# Patient Record
Sex: MALE | Race: WHITE | NOT HISPANIC OR LATINO | URBAN - METROPOLITAN AREA
[De-identification: names, ages, dates, MRNs, and addresses within clinical notes are randomized per-mention and may not be internally consistent; named-entity substitution may affect disease eponyms.]

---

## 2019-02-13 ENCOUNTER — OFFICE VISIT - HEALTHEAST (OUTPATIENT)
Dept: FAMILY MEDICINE | Facility: CLINIC | Age: 29
End: 2019-02-13

## 2019-02-13 DIAGNOSIS — R07.89 ATYPICAL CHEST PAIN: ICD-10-CM

## 2019-02-13 DIAGNOSIS — Z00.00 VISIT FOR PREVENTIVE HEALTH EXAMINATION: ICD-10-CM

## 2019-02-13 LAB
ALBUMIN SERPL-MCNC: 4.5 G/DL (ref 3.5–5)
ALP SERPL-CCNC: 78 U/L (ref 45–120)
ALT SERPL W P-5'-P-CCNC: 18 U/L (ref 0–45)
ANION GAP SERPL CALCULATED.3IONS-SCNC: 5 MMOL/L (ref 5–18)
AST SERPL W P-5'-P-CCNC: 19 U/L (ref 0–40)
BILIRUB SERPL-MCNC: 1.1 MG/DL (ref 0–1)
BUN SERPL-MCNC: 13 MG/DL (ref 8–22)
CALCIUM SERPL-MCNC: 9.2 MG/DL (ref 8.5–10.5)
CHLORIDE BLD-SCNC: 106 MMOL/L (ref 98–107)
CHOLEST SERPL-MCNC: 184 MG/DL
CO2 SERPL-SCNC: 31 MMOL/L (ref 22–31)
CREAT SERPL-MCNC: 1.01 MG/DL (ref 0.7–1.3)
FASTING STATUS PATIENT QL REPORTED: NORMAL
GFR SERPL CREATININE-BSD FRML MDRD: >60 ML/MIN/1.73M2
GLUCOSE BLD-MCNC: 80 MG/DL (ref 70–125)
HBA1C MFR BLD: 4.7 % (ref 3.5–6)
HDLC SERPL-MCNC: 66 MG/DL
LDLC SERPL CALC-MCNC: 104 MG/DL
POTASSIUM BLD-SCNC: 4 MMOL/L (ref 3.5–5)
PROT SERPL-MCNC: 6.8 G/DL (ref 6–8)
SODIUM SERPL-SCNC: 142 MMOL/L (ref 136–145)
TRIGL SERPL-MCNC: 71 MG/DL

## 2019-02-13 ASSESSMENT — MIFFLIN-ST. JEOR: SCORE: 1721.96

## 2019-02-14 ENCOUNTER — COMMUNICATION - HEALTHEAST (OUTPATIENT)
Dept: FAMILY MEDICINE | Facility: CLINIC | Age: 29
End: 2019-02-14

## 2019-04-10 ENCOUNTER — COMMUNICATION - HEALTHEAST (OUTPATIENT)
Dept: FAMILY MEDICINE | Facility: CLINIC | Age: 29
End: 2019-04-10

## 2019-04-18 ENCOUNTER — ANCILLARY PROCEDURE (OUTPATIENT)
Dept: CARDIOLOGY | Facility: CLINIC | Age: 29
End: 2019-04-18
Attending: FAMILY MEDICINE
Payer: COMMERCIAL

## 2019-04-18 ENCOUNTER — RECORDS - HEALTHEAST (OUTPATIENT)
Dept: ADMINISTRATIVE | Facility: OTHER | Age: 29
End: 2019-04-18

## 2019-04-18 DIAGNOSIS — R07.89 ATYPICAL CHEST PAIN: ICD-10-CM

## 2019-10-07 ENCOUNTER — AMBULATORY - HEALTHEAST (OUTPATIENT)
Dept: FAMILY MEDICINE | Facility: CLINIC | Age: 29
End: 2019-10-07

## 2020-02-13 ENCOUNTER — COMMUNICATION - HEALTHEAST (OUTPATIENT)
Dept: FAMILY MEDICINE | Facility: CLINIC | Age: 30
End: 2020-02-13

## 2021-04-01 ENCOUNTER — E-VISIT (OUTPATIENT)
Dept: URGENT CARE | Facility: URGENT CARE | Age: 31
End: 2021-04-01
Payer: COMMERCIAL

## 2021-04-01 DIAGNOSIS — J02.9 SORE THROAT: ICD-10-CM

## 2021-04-01 DIAGNOSIS — Z20.822 SUSPECTED COVID-19 VIRUS INFECTION: ICD-10-CM

## 2021-04-01 PROCEDURE — 99421 OL DIG E/M SVC 5-10 MIN: CPT | Performed by: PHYSICIAN ASSISTANT

## 2021-04-01 NOTE — PATIENT INSTRUCTIONS
Dear Turner Jasso,    Your symptoms show that you may have coronavirus (COVID-19). This illness can cause fever, cough and trouble breathing. Many people get a mild case and get better on their own. Some people can get very sick.    Because you also reported sore throat I would like to also test you for Strep Throat to determine if we need to treat you for that as well.    What should I do?  We would like to test you for Covid-19 virus and Strep Throat. I have placed orders for these tests.   To schedule: go to your [employer] home page and scroll down to the section that says  You have an appointment that needs to be scheduled  and click the large green button that says  Schedule Now  and follow the steps to find the next available openings. It is important that when you are asked what the reason for your appointment is that you mention you need BOTH Covid and Strep tests.    If you are unable to complete these [employer] scheduling steps, please call [phone] to schedule your testing.     Return to work/school/ guidance:   Please let your workplace manager and staffing office know when your quarantine ends     We can t give you an exact date as it depends on the above. You can calculate this on your own or work with your manager/staffing office to calculate this. (For example if you were exposed on 10/4, you would have to quarantine for 14 full days. That would be through 10/18. You could return on 10/19.)      If you receive a positive COVID-19 test result, follow the guidance of the those who are giving you the results. Usually the return to work is 10 (or in some cases 20 days from symptom onset.) If you work at [facility] [facility], you must also be cleared by Employee Occupational Health and Safety to return to work.        If you receive a negative COVID-19 test result and did not have a high risk exposure to someone with a known positive COVID-19 test, you can return to work once you're free of fever  for 24 hours without fever-reducing medication and your symptoms are improving or resolved.      If you receive a negative COVID-19 test and If you had a high risk exposure to someone who has tested positive for COVID-19 then you can return to work 14 days after your last contact with the positive individual    Note: If you have ongoing exposure to the covid positive person, this quarantine period may be more than 14 days. (For example, if you are continued to be exposed to your child who tested positive and cannot isolate from them, then the quarantine of 7-14 days can't start until your child is no longer contagious. This is typically 10 days from onset of the child's symptoms. So the total duration may be 17-24 days in this case.)    Sign up for [email or web address].   We know it's scary to hear that you might have COVID-19. We want to track your symptoms to make sure you're okay over the next 2 weeks. Please look for an email from [email or web address]--this is a free, online program that we'll use to keep in touch. To sign up, follow the link in the email you will receive. Learn more at [email or web address]    How can I take care of myself?    Get lots of rest. Drink extra fluids (unless a doctor has told you not to)    Take Tylenol (acetaminophen) or ibuprofen for fever or pain. If you have liver or kidney problems, ask your family doctor if it's okay to take Tylenol o ibuprofen    If you have other health problems (like cancer, heart failure, an organ transplant or severe kidney disease): Call your specialty clinic if you don't feel better in the next 2 days.    Know when to call 911. Emergency warning signs include:  o Trouble breathing or shortness of breath  o Pain or pressure in the chest that doesn't go away  o Feeling confused like you haven't felt before, or not being able to wake up  o Bluish-colored lips or face    Where can I get more information?  [facility] - About COVID-19:    [email or web address]    CDC - What to Do If You're Sick:   www.cdc.gov/coronavirus/2019-ncov/about/steps-when-sick.html

## 2021-04-21 ENCOUNTER — AMBULATORY - HEALTHEAST (OUTPATIENT)
Dept: NURSING | Facility: CLINIC | Age: 31
End: 2021-04-21

## 2021-04-25 ENCOUNTER — HEALTH MAINTENANCE LETTER (OUTPATIENT)
Age: 31
End: 2021-04-25

## 2021-05-12 ENCOUNTER — AMBULATORY - HEALTHEAST (OUTPATIENT)
Dept: NURSING | Facility: CLINIC | Age: 31
End: 2021-05-12

## 2021-05-27 ENCOUNTER — RECORDS - HEALTHEAST (OUTPATIENT)
Dept: ADMINISTRATIVE | Facility: CLINIC | Age: 31
End: 2021-05-27

## 2021-05-27 NOTE — TELEPHONE ENCOUNTER
PT saw Dr. Tong In 2/2019.  I asked if this is for EKG or the echo?  Pt states it's the echo.  Pt will have it done at [facility] instead.

## 2021-05-27 NOTE — TELEPHONE ENCOUNTER
Orders being requested: to go [facility]   Reason service is needed/diagnosis: ekg  When are orders needed by: asap  Where to send Orders: Fax: [phone]  Okay to leave detailed message?  Yes

## 2021-06-02 VITALS — WEIGHT: 170.75 LBS | HEIGHT: 69 IN | BODY MASS INDEX: 25.29 KG/M2

## 2021-06-02 NOTE — PROGRESS NOTES
Called and spoke with the patient on October 7, 2019, echocardiogram was done on the April 18, 2019 at the [facility], but I did not get the results up until today.  Echocardiogram was normal, patient stating that he still experiencing occasional upper left chest pain, we discussed about the possibility of a muscular chest pain causing his symptoms, symptomatic care advised, and follow-up as needed.

## 2021-06-06 NOTE — TELEPHONE ENCOUNTER
Patient Quality Outreach      Summary:    Patient is due/failing the following:   Asthma follow-up visit    Type of outreach:    Sent [email or web address] message.    Questions for provider review:    None                                                                                   **Start Working phrase here:**  Patient has the following on his problem list:     Depression / Dysthymia review       PHQ-9 DEPRESSION SCALE 2/13/2019   Little interest or pleasure in doing things 3   Feeling down, depressed, or hopeless 2   Trouble falling or staying asleep, or sleeping too much 2   Feeling tired or having little energy 1   Poor appetite or overeating 0   Feeling bad about yourself - or that you are a failure or have let yourself or your family down 1   Trouble concentrating on things, such as reading the newspaper or watching television 1   Moving or speaking so slowly that other people could have noticed. Or the opposite - being so fidgety or restless that you have been moving around a lot more than usual 1   Thoughts that you would be better off dead, or of hurting yourself in some way 0   PHQ-9 Total Score 11   Some recent data might be hidden       If PHQ-9 recheck is 5 or more, route to provider for next steps. and     Asthma review      @[id number])@                                                     Charo RUST CMA     Chart routed to Care Team.

## 2021-06-17 NOTE — PATIENT INSTRUCTIONS - HE
Patient Instructions by Maurice Tong MD at 2/13/2019  3:00 PM     Author: Maurice Tong MD Service: -- Author Type: Physician    Filed: 2/14/2019 10:49 AM Encounter Date: 2/13/2019 Status: Signed    : Maurice Tong MD (Physician)       Patient Education     Prevention Guidelines, Men Ages 18 to 39  Screening tests and vaccines are an important part of managing your health. A screening test is done to find possible disorders or diseases in people who don't have any symptoms. The goal is to find a disease early so lifestyle changes can be made and you can be watched more closely to reduce the risk of disease, or to detect it early enough to treat it most effectively. Screening tests are not considered diagnostic, but are used to determine if more testing is needed. Health counseling is essential, too. Below are guidelines for these, for men ages 18 to 39. Talk with your healthcare provider to make sure youre up-to-date on what you need.  Screening Who needs it How often   Alcohol misuse All men in this age group At routine exams   Blood pressure All men in this age group Yearly checkup if your blood pressure is normal  Normal blood pressure is less than 120/80  If your blood pressure is higher than normal, follow the advice of your healthcare provider.     All men in this age group At routine exams   Diabetes mellitus, type 2 All men who have no symptoms but are overweight or obese and have 1 or more other risk factors for diabetes At least every 3 years (yearly if blood sugar has already started to rise)   Diabetes mellitus, type 2  All men with prediabetes Every year   Hepatitis C If at increased risk At routine exams   High cholesterol or triglycerides All men ages 35 and older, and younger men at high risk for coronary artery disease At least every 5 years   HIV All men At routine exams   Obesity All men in this age group At routine exams   Syphilis Men at increased risk  for infection - talk with your healthcare provider At routine exams   Tuberculosis Men at increased risk for infection - talk with your healthcare provider Check with your healthcare provider   Vision All men in this age group Every 5 to 10 years if no risk factors for eye disease   Vaccines Who needs it How often   Chickenpox (varicella) All men in this age group who have no record of this infection or vaccine 2 doses; the second dose should be given at least 4 weeks after the first dose   Hepatitis A Men at increased risk for infection - talk with your healthcare provider 2 doses given at least 6 months apart   Hepatitis B Men at increased risk for infection - talk with your healthcare provider 3 doses over 6 months; second dose should be given 1 month after the first dose; the third dose should be given at least 2 months after the second dose and at least 4 months after the first dose   Haemophilus influenzae Type B (HIB) Men at increased risk for infection - talk with your healthcare provider 1 to 3 doses   Human papillomavirus (HPV) All men in this age group up to age 26 3 doses; the second dose should be given 1 to 2 months after the first dose and the third dose given 6 months after the first dose   Influenza (flu) All men in this age group Once a year   Measles, mumps, rubella (MMR) All men in this age group who have no record of these infections or vaccines 1 or 2 doses through age 55   Meningococcal Men at increased risk for infection - talk with your healthcare provider 1 or more doses   Pneumococca (PCV13) and Pneumococcal (PPSV23) Men at increased risk for infection - talk with your healthcare provider PCV13: 1 dose ages 19 to 65 (protects against 13 types of pneumococcal bacteria)  PPSV23: 1 to 2 doses through age 64, or 1 dose at 65 or older (protects against 23 types of pneumococcal bacteria)   Tetanus/diphtheria/pertussis (Td/Tdap) booster All men in this age group A one-time Tdap booster after age  18, then Td every10 years   Counseling Who needs it How often   Diet and exercise Overweight or obese people When diagnosed, and then at routine exams   Use of tobacco and the health effects it can cause All men in this age group Every visit   Sexually transmitted infection prevention Men who are sexually active At routine exams   Skin cancer Prevention of skin cancer in fair-skinned adults through age 24 At routine exams   1Those who are 18 years of age, who are not up-to-date on their childhood immunizations, should receive all appropriate catch-up vaccines recommended by the CDC.  Date Last Reviewed: 2/1/2017 2000-2017 [employer]. [address] [place] [address], [place] [address]. All rights reserved. This information is not intended as a substitute for professional medical care. Always follow your healthcare professional's instructions.

## 2021-06-18 NOTE — LETTER
Letter by Maurice Tong MD at      Author: Maurice Tong MD Service: -- Author Type: --    Filed:  Encounter Date: 2/14/2019 Status: (Other)       Turner Jasso  [address]  [place] [address]             February 14, 2019         Dear Mr. Jasso,    Below are the results from your recent visit:    Resulted Orders   Glycosylated Hemoglobin A1c   Result Value Ref Range    Hemoglobin A1c 4.7 3.5 - 6.0 %   Comprehensive Metabolic Panel   Result Value Ref Range    Sodium 142 136 - 145 mmol/L    Potassium 4.0 3.5 - 5.0 mmol/L    Chloride 106 98 - 107 mmol/L    CO2 31 22 - 31 mmol/L    Anion Gap, Calculation 5 5 - 18 mmol/L    Glucose 80 70 - 125 mg/dL    BUN 13 8 - 22 mg/dL    Creatinine 1.01 0.70 - 1.30 mg/dL    GFR MDRD Af Amer >60 >60 mL/min/1.73m2    GFR MDRD Non Af Amer >60 >60 mL/min/1.73m2    Bilirubin, Total 1.1 (H) 0.0 - 1.0 mg/dL    Calcium 9.2 8.5 - 10.5 mg/dL    Protein, Total 6.8 6.0 - 8.0 g/dL    Albumin 4.5 3.5 - 5.0 g/dL    Alkaline Phosphatase 78 45 - 120 U/L    AST 19 0 - 40 U/L    ALT 18 0 - 45 U/L    Narrative    Fasting Glucose reference range is 70-99 mg/dL per  American Diabetes Association (ADA) guidelines.   Lipid Cascade   Result Value Ref Range    Cholesterol 184 <=199 mg/dL    Triglycerides 71 <=149 mg/dL    HDL Cholesterol 66 >=40 mg/dL    LDL Calculated 104 <=129 mg/dL    Patient Fasting > 8hrs? Unknown        Your recent blood test results  were all within acceptable limit.  Please do not hesitate to contact me if you have any question.  Dr Tong      Please call with questions or contact us using [employer].    Sincerely,        Electronically signed by Maurice Tong MD

## 2021-06-24 NOTE — PROGRESS NOTES
Assessment:      Healthy male exam.        Plan:     1.  A typical left-sided chest pain, persistent over time, did review possible differential diagnosis, we will get an echocardiogram, if normal then will continue to focus on musculoskeletal chest pain management.  2. Patient Counseling:  --Nutrition: Stressed importance of moderation in sodium/caffeine intake, saturated fat and cholesterol, caloric balance, sufficient intake of fresh fruits, vegetables, fiber, calcium, iron.  --Discussed the issue of calcium supplement, and the daily use of baby aspirin.  --Exercise: Stressed the importance of regular exercise.   --Substance Abuse: Discussed cessation/primary prevention of tobacco, alcohol, or other drug use; driving or other dangerous activities under the influence; availability of treatment for abuse.    --Sexuality: Discussed sexually transmitted diseases, partner selection, use of condoms, avoidance of unintended pregnancy.  --Injury prevention: Discussed safety belts, safety helmets, smoke detector, smoking near bedding or upholstery.   --Dental health: Discussed importance of regular tooth brushing, flossing, and dental visits.  --Immunizations reviewed.  --Discussed timing and benefits of screening colonoscopy and alternative options.  --After hours service discussed with patient             -- I have had an Advance Directives discussion with the patient.  The following high BMI interventions were performed this visit: encouragement to exercise    3. Discussed the patient's BMI with him.  The BMI is above average; BMI management plan is completed  4. Follow up as needed for acute illness     Subjective:      Turner Jasos is a 28 y.o. male who presents for an annual exam. The patient reports that there is not domestic violence in his life.   Last office visit was in 2014, stating that he has been doing okay since then, no major medical complaint, intermittent left-sided chest pain, sharp lasting a few  minutes, not typically related with exercise or rest, does appear randomly, sometimes short of breath.  Has tried PPI in the past with mixed result.  Chest pain was more frequent when he was sedentary about a year ago, but by staying active the pain seems to be recurring once or twice a month.    Healthy Habits:   Regular Exercise: Yes  Sunscreen Use: Yes  Healthy Diet: Yes  Dental Visits Regularly: Yes  Seat Belt: Yes  Sexually active: Yes  Monthly Self Testicular Exams:  Yes  Hemoccults: N/A  Flex Sig: N/A  Colonoscopy: N/A  Lipid Profile: No  Glucose Screen: Yes  Prevention of Osteoporosis: Yes  Last Dexa: N/A  Guns at Home:  N/A      Immunization History   Administered Date(s) Administered     DTaP, historic 01/08/1992, 03/06/1992, 05/07/1992, 09/02/1993, 01/03/1997     Hep B, historic 07/18/2003, 08/18/2003, 03/05/2004     HiB, historic,unspecified 03/16/1992, 06/08/1992     IPV 01/08/1992, 03/06/1992, 05/07/1992, 01/03/1997     Influenza, inj, historic,unspecified 01/09/2002, 01/15/2003, 12/02/2003     MMR 09/02/1993, 01/03/1997, 07/18/2003     Td,adult,historic,unspecified 07/18/2003     Tdap 02/13/2019     Varicella 01/01/1900, 09/15/2000     Immunization status: stated as current, but no records available.  Vision Screening:both eyes  Hearing: PASS     No current outpatient medications on file.     No current facility-administered medications for this visit.      No past medical history on file.  No past surgical history on file.  Patient has no known allergies.  No family history on file.  Social History     Socioeconomic History     Marital status: Single     Spouse name: Not on file     Number of children: Not on file     Years of education: Not on file     Highest education level: Not on file   Social Needs     Financial resource strain: Not on file     Food insecurity - worry: Not on file     Food insecurity - inability: Not on file     Transportation needs - medical: Not on file     Transportation  "needs - non-medical: Not on file   Occupational History     Not on file   Tobacco Use     Smoking status: Never Smoker     Smokeless tobacco: Never Used   Substance and Sexual Activity     Alcohol use: Yes     Frequency: 2-3 times a week     Drug use: Not on file     Sexual activity: Not on file   Other Topics Concern     Not on file   Social History Narrative     Not on file     No specialty comments available.  Review of Systems  General:  Denies problem  Eyes: Denies problem  Ears/Nose/Throat: Denies problem  Cardiovascular: Denies problem  Respiratory:  Denies problem  Gastrointestinal:  Denies problem  Genitourinary: Denies problem  Musculoskeletal:  Denies problem  Skin: Denies problem  Neurologic: Denies problem  Psychiatric: Denies problem  Endocrine: Denies problem  Heme/Lymphatic: Denies problem   Allergic/Immunologic: Denies problem        Objective:     Vitals:    02/13/19 1510   BP: 115/65   Pulse: 68   SpO2: 99%   Weight: 170 lb 12 oz (77.5 kg)   Height: 5' 8.5\" (1.74 m)     Body mass index is 25.58 kg/m .    Physical  General Appearance: Alert, cooperative, no distress, appears stated age  Head: Normocephalic, without obvious abnormality, atraumatic  Eyes: PERRL, conjunctiva/corneas clear, EOM's intact  Ears: Normal TM's and external ear canals, both ears  Nose: Nares normal, septum midline,mucosa normal, no drainage  Throat: Lips, mucosa, and tongue normal; teeth and gums normal  Neck: Supple, symmetrical, trachea midline, no adenopathy;  thyroid: not enlarged, symmetric, no tenderness/mass/nodules; no carotid bruit or JVD  Back: Symmetric, no curvature, ROM normal, no CVA tenderness  Lungs: Clear to auscultation bilaterally, respirations unlabored  Heart: Regular rate and rhythm, S1 and S2 normal, no murmur, rub, or gallop,  Abdomen: Soft, non-tender, bowel sounds active all four quadrants,  no masses, no organomegaly  Genitourinary: Penis normal. Right and left testis are descended.No palpable " masses.   Rectal: No visible external lesion  Musculoskeletal: Normal range of motion. No joint swelling or deformity.   Extremities: Extremities normal, atraumatic, no cyanosis or edema  Skin: Skin color, texture, turgor normal, no rashes or lesions  Lymph nodes: Cervical, supraclavicular, and axillary nodes normal  Neurologic: He is alert. He has normal reflexes.   Psychiatric: He has a normal mood and affect.        MD Turner Green was seen today for establish care, annual exam and chest pain.    Diagnoses and all orders for this visit:    Visit for preventive health examination  -     Glycosylated Hemoglobin A1c  -     Comprehensive Metabolic Panel  -     Lipid Cascade    Atypical chest pain  -     Echo Complete; Future    Other orders  -     Tdap vaccine,  6yo or older,  IM

## 2021-10-10 ENCOUNTER — HEALTH MAINTENANCE LETTER (OUTPATIENT)
Age: 31
End: 2021-10-10

## 2022-05-21 ENCOUNTER — HEALTH MAINTENANCE LETTER (OUTPATIENT)
Age: 32
End: 2022-05-21

## 2022-09-18 ENCOUNTER — HEALTH MAINTENANCE LETTER (OUTPATIENT)
Age: 32
End: 2022-09-18

## 2023-04-27 ENCOUNTER — OFFICE VISIT (OUTPATIENT)
Dept: FAMILY MEDICINE | Facility: CLINIC | Age: 33
End: 2023-04-27
Payer: COMMERCIAL

## 2023-04-27 VITALS
TEMPERATURE: 98.1 F | RESPIRATION RATE: 16 BRPM | SYSTOLIC BLOOD PRESSURE: 122 MMHG | BODY MASS INDEX: 27.13 KG/M2 | WEIGHT: 183.2 LBS | HEART RATE: 80 BPM | OXYGEN SATURATION: 100 % | DIASTOLIC BLOOD PRESSURE: 71 MMHG | HEIGHT: 69 IN

## 2023-04-27 DIAGNOSIS — Z13.1 SCREENING FOR DIABETES MELLITUS: ICD-10-CM

## 2023-04-27 DIAGNOSIS — K92.9 FUNCTIONAL GASTROINTESTINAL DISTURBANCE: ICD-10-CM

## 2023-04-27 DIAGNOSIS — Z13.220 LIPID SCREENING: ICD-10-CM

## 2023-04-27 DIAGNOSIS — M54.42 ACUTE BILATERAL LOW BACK PAIN WITH LEFT-SIDED SCIATICA: ICD-10-CM

## 2023-04-27 DIAGNOSIS — Z00.00 ROUTINE GENERAL MEDICAL EXAMINATION AT A HEALTH CARE FACILITY: Primary | ICD-10-CM

## 2023-04-27 DIAGNOSIS — Z13.0 SCREENING FOR IRON DEFICIENCY ANEMIA: ICD-10-CM

## 2023-04-27 LAB
CHOLEST SERPL-MCNC: 176 MG/DL
ERYTHROCYTE [DISTWIDTH] IN BLOOD BY AUTOMATED COUNT: 12.4 % (ref 10–15)
HBA1C MFR BLD: 5 % (ref 0–5.6)
HCT VFR BLD AUTO: 44.7 % (ref 40–53)
HDLC SERPL-MCNC: 47 MG/DL
HGB BLD-MCNC: 15 G/DL (ref 13.3–17.7)
LDLC SERPL CALC-MCNC: 118 MG/DL
MCH RBC QN AUTO: 30.7 PG (ref 26.5–33)
MCHC RBC AUTO-ENTMCNC: 33.6 G/DL (ref 31.5–36.5)
MCV RBC AUTO: 91 FL (ref 78–100)
NONHDLC SERPL-MCNC: 129 MG/DL
PLATELET # BLD AUTO: 175 10E3/UL (ref 150–450)
RBC # BLD AUTO: 4.89 10E6/UL (ref 4.4–5.9)
TRIGL SERPL-MCNC: 55 MG/DL
WBC # BLD AUTO: 4.1 10E3/UL (ref 4–11)

## 2023-04-27 PROCEDURE — 80061 LIPID PANEL: CPT | Performed by: NURSE PRACTITIONER

## 2023-04-27 PROCEDURE — 99385 PREV VISIT NEW AGE 18-39: CPT | Performed by: NURSE PRACTITIONER

## 2023-04-27 PROCEDURE — 80053 COMPREHEN METABOLIC PANEL: CPT | Performed by: NURSE PRACTITIONER

## 2023-04-27 PROCEDURE — 83036 HEMOGLOBIN GLYCOSYLATED A1C: CPT | Performed by: NURSE PRACTITIONER

## 2023-04-27 PROCEDURE — 36415 COLL VENOUS BLD VENIPUNCTURE: CPT | Performed by: NURSE PRACTITIONER

## 2023-04-27 PROCEDURE — 85027 COMPLETE CBC AUTOMATED: CPT | Performed by: NURSE PRACTITIONER

## 2023-04-27 PROCEDURE — 99213 OFFICE O/P EST LOW 20 MIN: CPT | Mod: 25 | Performed by: NURSE PRACTITIONER

## 2023-04-27 ASSESSMENT — ASTHMA QUESTIONNAIRES
QUESTION_5 LAST FOUR WEEKS HOW WOULD YOU RATE YOUR ASTHMA CONTROL: COMPLETELY CONTROLLED
QUESTION_4 LAST FOUR WEEKS HOW OFTEN HAVE YOU USED YOUR RESCUE INHALER OR NEBULIZER MEDICATION (SUCH AS ALBUTEROL): NOT AT ALL
QUESTION_1 LAST FOUR WEEKS HOW MUCH OF THE TIME DID YOUR ASTHMA KEEP YOU FROM GETTING AS MUCH DONE AT WORK, SCHOOL OR AT HOME: NONE OF THE TIME
QUESTION_2 LAST FOUR WEEKS HOW OFTEN HAVE YOU HAD SHORTNESS OF BREATH: NOT AT ALL
ACT_TOTALSCORE: 25
ACT_TOTALSCORE: 25
QUESTION_3 LAST FOUR WEEKS HOW OFTEN DID YOUR ASTHMA SYMPTOMS (WHEEZING, COUGHING, SHORTNESS OF BREATH, CHEST TIGHTNESS OR PAIN) WAKE YOU UP AT NIGHT OR EARLIER THAN USUAL IN THE MORNING: NOT AT ALL

## 2023-04-27 ASSESSMENT — ENCOUNTER SYMPTOMS
SHORTNESS OF BREATH: 0
CONSTIPATION: 0
HEMATOCHEZIA: 0
WEAKNESS: 0
EYE PAIN: 0
DYSURIA: 0
FREQUENCY: 0
FEVER: 0
ARTHRALGIAS: 1
COUGH: 0
JOINT SWELLING: 0
PALPITATIONS: 0
HEMATURIA: 0
DIZZINESS: 0
CHILLS: 0
SORE THROAT: 0
NAUSEA: 0
NERVOUS/ANXIOUS: 0
DIARRHEA: 0
MYALGIAS: 1
ABDOMINAL PAIN: 0
PARESTHESIAS: 0
HEARTBURN: 0
HEADACHES: 0

## 2023-04-27 ASSESSMENT — PAIN SCALES - GENERAL: PAINLEVEL: NO PAIN (0)

## 2023-04-27 NOTE — PATIENT INSTRUCTIONS
Low FODMAP diet      Preventive Health Recommendations  Male Ages 26 - 39    Yearly exam:             See your health care provider every year in order to  o   Review health changes.   o   Discuss preventive care.    o   Review your medicines if your doctor has prescribed any.  You should be tested each year for STDs (sexually transmitted diseases), if you re at risk.   After age 35, talk to your provider about cholesterol testing. If you are at risk for heart disease, have your cholesterol tested at least every 5 years.   If you are at risk for diabetes, you should have a diabetes test (fasting glucose).  Shots: Get a flu shot each year. Get a tetanus shot every 10 years.     Nutrition:  Eat at least 5 servings of fruits and vegetables daily.   Eat whole-grain bread, whole-wheat pasta and brown rice instead of white grains and rice.   Get adequate Calcium and Vitamin D.     Lifestyle  Exercise for at least 150 minutes a week (30 minutes a day, 5 days a week). This will help you control your weight and prevent disease.   Limit alcohol to one drink per day.   No smoking.   Wear sunscreen to prevent skin cancer.   See your dentist every six months for an exam and cleaning.

## 2023-04-27 NOTE — PROGRESS NOTES
SUBJECTIVE:   CC: Turner is an 32 year old who presents for preventative health visit.       4/27/2023     7:53 AM   Additional Questions   Roomed by Anastasia Quiles   Accompanied by Self   Patient has been advised of split billing requirements and indicates understanding: Yes  Healthy Habits:     Getting at least 3 servings of Calcium per day:  Yes    Bi-annual eye exam:  Yes    Dental care twice a year:  NO    Sleep apnea or symptoms of sleep apnea:  None    Diet:  Regular (no restrictions)    Frequency of exercise:  1 day/week    Duration of exercise:  15-30 minutes    Taking medications regularly:  Not Applicable    Medication side effects:  Not applicable    PHQ-2 Total Score: 0    Additional concerns today:  Yes      Today's PHQ-2 Score:       4/27/2023     7:44 AM   PHQ-2 ( 1999 [employer])   Q1: Little interest or pleasure in doing things 0   Q2: Feeling down, depressed or hopeless 0   PHQ-2 Score 0   Q1: Little interest or pleasure in doing things Not at all    Not at all   Q2: Feeling down, depressed or hopeless Not at all    Not at all   PHQ-2 Score 0    0           Social History     Tobacco Use     Smoking status: Never     Smokeless tobacco: Never   Vaping Use     Vaping status: Never Used   Substance Use Topics     Alcohol use: Yes             4/27/2023     7:44 AM   Alcohol Use   Prescreen: >3 drinks/day or >7 drinks/week? No       Last PSA: No results found for: PSA    Reviewed orders with patient. Reviewed health maintenance and updated orders accordingly - Yes  Lab work is in process    Reviewed and updated as needed this visit by clinical staff   Tobacco  Allergies  Meds              Reviewed and updated as needed this visit by Provider                 COVID x 2, exactly 1 year apart.  Feels like GI tract hasn't been the same.  Feeling more gassy and bloated, especially in the evening.  Feels like fruit are the biggest culprit.      Skateboarded as a child, many broken bones.  Went on a trip to [place] in  "December, had pain in back that felt like sciatica.      Review of Systems   Constitutional: Negative for chills and fever.   HENT: Negative for congestion, ear pain, hearing loss and sore throat.    Eyes: Negative for pain and visual disturbance.   Respiratory: Negative for cough and shortness of breath.    Cardiovascular: Negative for chest pain, palpitations and peripheral edema.   Gastrointestinal: Negative for abdominal pain, constipation, diarrhea, heartburn, hematochezia and nausea.   Genitourinary: Negative for dysuria, frequency, genital sores, hematuria, impotence, penile discharge and urgency.   Musculoskeletal: Positive for arthralgias and myalgias. Negative for joint swelling.   Skin: Negative for rash.   Neurological: Negative for dizziness, weakness, headaches and paresthesias.   Psychiatric/Behavioral: Negative for mood changes. The patient is not nervous/anxious.          OBJECTIVE:   /71 (BP Location: Right arm, Patient Position: Sitting, Cuff Size: Adult Regular)   Pulse 80   Temp 98.1  F (36.7  C) (Tympanic)   Resp 16   Ht 1.745 m (5' 8.7\")   Wt 83.1 kg (183 lb 3.2 oz)   SpO2 100%   BMI 27.29 kg/m      Physical Exam  GENERAL: healthy, alert and no distress  EYES: Eyes grossly normal to inspection, PERRL and conjunctivae and sclerae normal  HENT: ear canals and TM's normal, nose and mouth without ulcers or lesions  NECK: no adenopathy, no asymmetry, masses, or scars and thyroid normal to palpation  RESP: lungs clear to auscultation - no rales, rhonchi or wheezes  CV: regular rate and rhythm, normal S1 S2, no S3 or S4, no murmur, click or rub, no peripheral edema and peripheral pulses strong  ABDOMEN: soft, nontender, no hepatosplenomegaly, no masses and bowel sounds normal  MS: no gross musculoskeletal defects noted, no edema  SKIN: no suspicious lesions or rashes  NEURO: Normal strength and tone, mentation intact and speech normal  PSYCH: mentation appears normal, affect " "normal/bright        ASSESSMENT/PLAN:   (Z00.00) Routine general medical examination at a health care facility  (primary encounter diagnosis)  Comment: Reviewed medical/social/family history and health maintenance  Plan:     (K92.9) Functional gastrointestinal disturbance  Comment: Mostly bloating and not tolerating fruits after multiple COVID infections.  Recommended trial of low FODMAP diet to identify additional triggers.  Can use OTC gas reducing medications as well.  Recommended probiotics.    Plan:     (M54.42) Acute bilateral low back pain with left-sided sciatica  Comment: Comes and goes, relief with chiropractic.  We can consider PT referral if symptoms worsen or persist.   OK to use NSAIDS/Tylenol as needed and we also discussed neutral stretching exercises and low impact activities like walking to maintain core strength  Plan:     (Z13.220) Lipid screening  Comment:   Plan: Lipid panel reflex to direct LDL Fasting            (Z13.1) Screening for diabetes mellitus  Comment:   Plan: Hemoglobin A1c, Comprehensive metabolic panel         (BMP + Alb, Alk Phos, ALT, AST, Total. Bili,         TP)            (Z13.0) Screening for iron deficiency anemia  Comment:   Plan: CBC with platelets              Patient has been advised of split billing requirements and indicates understanding: Yes      COUNSELING:   Reviewed preventive health counseling, as reflected in patient instructions      BMI:   Estimated body mass index is 27.29 kg/m  as calculated from the following:    Height as of this encounter: 1.745 m (5' 8.7\").    Weight as of this encounter: 83.1 kg (183 lb 3.2 oz).   Weight management plan: Discussed healthy diet and exercise guidelines      He reports that he has never smoked. He has never used smokeless tobacco.            GALLO Cramer CNP  [facility]  "

## 2023-04-28 LAB
ALBUMIN SERPL BCG-MCNC: 4.6 G/DL (ref 3.5–5.2)
ALP SERPL-CCNC: 98 U/L (ref 40–129)
ALT SERPL W P-5'-P-CCNC: 23 U/L (ref 10–50)
ANION GAP SERPL CALCULATED.3IONS-SCNC: 12 MMOL/L (ref 7–15)
AST SERPL W P-5'-P-CCNC: 23 U/L (ref 10–50)
BILIRUB SERPL-MCNC: 0.5 MG/DL
BUN SERPL-MCNC: 12.6 MG/DL (ref 6–20)
CALCIUM SERPL-MCNC: 9.1 MG/DL (ref 8.6–10)
CHLORIDE SERPL-SCNC: 104 MMOL/L (ref 98–107)
CREAT SERPL-MCNC: 0.89 MG/DL (ref 0.67–1.17)
DEPRECATED HCO3 PLAS-SCNC: 25 MMOL/L (ref 22–29)
GFR SERPL CREATININE-BSD FRML MDRD: >90 ML/MIN/1.73M2
GLUCOSE SERPL-MCNC: 88 MG/DL (ref 70–99)
POTASSIUM SERPL-SCNC: 4.2 MMOL/L (ref 3.4–5.3)
PROT SERPL-MCNC: 7 G/DL (ref 6.4–8.3)
SODIUM SERPL-SCNC: 141 MMOL/L (ref 136–145)

## 2023-05-19 ENCOUNTER — E-VISIT (OUTPATIENT)
Dept: FAMILY MEDICINE | Facility: CLINIC | Age: 33
End: 2023-05-19
Payer: COMMERCIAL

## 2023-05-19 DIAGNOSIS — M54.41 ACUTE BILATERAL LOW BACK PAIN WITH BILATERAL SCIATICA: Primary | ICD-10-CM

## 2023-05-19 DIAGNOSIS — M54.42 ACUTE BILATERAL LOW BACK PAIN WITH BILATERAL SCIATICA: Primary | ICD-10-CM

## 2023-05-19 PROCEDURE — 99421 OL DIG E/M SVC 5-10 MIN: CPT | Performed by: NURSE PRACTITIONER

## 2023-05-19 RX ORDER — METHYLPREDNISOLONE 4 MG
TABLET, DOSE PACK ORAL
Qty: 21 TABLET | Refills: 0 | Status: SHIPPED | OUTPATIENT
Start: 2023-05-19 | End: 2024-01-12

## 2023-05-19 RX ORDER — METHOCARBAMOL 750 MG/1
750 TABLET, FILM COATED ORAL 3 TIMES DAILY PRN
Qty: 30 TABLET | Refills: 0 | Status: SHIPPED | OUTPATIENT
Start: 2023-05-19 | End: 2024-01-12

## 2023-05-19 NOTE — PATIENT INSTRUCTIONS
Thank you for choosing us for your care. I have placed an order for a prescription so that you can start treatment. View your full visit summary for details by clicking on the link below. Your pharmacist will able to address any questions you may have about the medication.      If you're not feeling better within 2-3 weeks please schedule an appointment.  You can schedule an appointment right here in [facility], or call [phone]  If the visit is for the same symptoms as your eVisit, we'll refund the cost of your eVisit if seen within seven days.    Caring for Your Back    You are not alone.    Low back pain is very common. Nearly half of all adults will have low back pain in any given year. The good news is that back pain is rarely a danger to your health. Most people can manage their back pain on their own. About half of people start feeling better within two weeks. In 9 out of 10 cases, low back pain goes away or no longer limits daily activity within 6 weeks.     Your outlook is good!     Your symptoms tell us that your low back pain is most likely not a danger to you. Most of the time we will not know the exact cause of low back pain, even if you see a doctor or have an MRI. However, treatment can still work without knowing the cause of the pain. Less than 1 in 100 people need surgery for their back pain.     What can I do about my low back pain?     There are three basic things you can do to ease low back pain and help it go away.     Use heat or cold packs.    Take medicine as directed.    Use positions, movements and exercises.    Using heat or cold packs:    Try cold packs or gentle heat to ease your pain.  Use whichever gives you the most relief. Apply the cold pack or heat for 15 minutes at a time, as often as needed.    Taking medicine:    If taking over-the-counter medicine:    Take ibuprofen (Advil, Motrin) 600 mg three times a day as needed for pain.  OR    Take Aleve (naproxen) 220 to 440 mg two  times a day as needed for pain. If your doctor prescribed a muscle relaxant (cyclobenzaprine 10 mg.):    Take   to 1 tablet at bedtime.    Do not drive when taking this medicine. This drug may make you sleepy.     Using positions, movements and exercises:    Research tells us that moving your joints and muscles can help you recover from back pain. Such activity should be simple and gentle. Use the positions below as well as walking to help relieve your pain. Try taking a short walk every 3 to 4 hours during the day. Walk for a few minutes inside your home or take longer walks outside, on a treadmill or at a mall. Slowly increase the amount of time you walk. Expect discomfort when you begin, but it should lessen as your back starts to heal. When your back feels better, walk daily to keep your back and body healthy.    Finding a position that is comfortable:    When your back pain is new, certain positions will ease your pain. Gently try each of the positions below until you find one that is helpful. Once you find a position of comfort, use it as often as you like when you are resting. You will recover faster if you combine rest with activity.    * Lie on your back with your legs bent. You can do this by placing a pillow under your knees or lie on the floor and rest your lower legs on the seat of a chair.  * Lie on your side with your knees bent and place a pillow between your knees.    Lie on your stomach over pillows.       When should I call my doctor?    Your back pain should improve over the first couple of weeks. As it improves, you should be able to return to your normal activities.  But call your doctor if:      You have a sudden change in your ability to control your bladder or bowels.    You begin to feel tingling in your groin or legs.    The pain spreads down your leg and into your foot.    Your toes, feet or leg muscles begin to feel weak.    You feel generally unwell or sick.    Your pain gets  worse.    If you are deaf or hard of hearing, please let us know. We provide many free services including sign language interpreters, oral interpreters, TTYs, telephone amplifiers, note takers and written materials.    For informational purposes only. Not to replace the advice of your health care provider. Copyright   2013 [facility]. All rights reserved. OUYA 187143 - 04/13.    Mini Relaxation Exercises  Source www.Tagoodiesfreeu.org    Mini relaxation exercises are focused breathing techniques that help reduce  anxiety and tension immediately. You can do them with your eyes open or  closed. You can do them any place, at any time; no one will know that you are  doing them.    Good Times to Do a  Mini     Before taking an exam    While at the computer lab waiting for your document to print    While waiting in line    When someone says something that bothers you    While waiting for the announcement of a grade    While waiting for a phone call    In the dentist s chair    When you feel overwhelmed by what you need to accomplish in the near  future    When in pain    When you want to     Mini Version 1- Breath Focused  Position yourself in a supportive comfortable chair or lying in a position that is least painful. (Often this is on your back with pillows under your knees)    a. Count very slowly to yourself from ten down to zero, one number for  each breath. Thus, with the first diaphragmatic breath, you say  ten  to  yourself, with the next breath, you say  nine , etc. If you start feeling  light-headed or dizzy, slow down the counting. When you get to  zero ,  see how you are feeling. If you are feeling better, great! If not, try to  do it again.    b. As you inhale, count very slowly up to four; as you exhale, count slowly  back down to one. Thus, as you inhale, you say to yourself  one, two,  three, four , as you exhale, you say to yourself  four, three, two, one .  Do this several times.    c.  After each inhalation, pause for a few seconds; after you exhale, pause  again for a few seconds. Do this for several breaths.    Mini Version 2- Physical Focused  a. Massage your forehead, jaw, back of neck, and shoulders  b. Stretch and yawn  c. Walk counting four paces as you breathe in and four paces as you  breathe out  d. Coordinate your breath with any activity, for example, writing, jogging,  drawing, lifting weights, walking, etc.    Mini Version 3- Imagery Focused  Position yourself in a supportive comfortable chair or lying in a position that is least painful. (Often this is on your back with pillows under your knees)    a. Briefly picture yourself in a place you find relaxing  b. Contemplate a picture of a natural scene  c. Imagine the characteristics of one of the following or any other object  which portrays characteristics you find calming or supportive in the  moment:    Tree (strong, rooted, expansive)    Mountain (timeless, strong, stable)    Waves (fluid, limitless)    Sun (radiant, warm)    Wind (free)    Mini Version 4- Mindfulness Focused  a. Look out the window for a few moments  b. Notice something new  c. Listen. What is the most distant sound you hear? The next distant?  d. Appreciate the sensation of being in the situation, the feel, smell, sight of  certain objects  e. Focus on being exactly where you are, keeping your thoughts from flowing  into the future or past

## 2023-05-26 ENCOUNTER — TELEPHONE (OUTPATIENT)
Dept: FAMILY MEDICINE | Facility: CLINIC | Age: 33
End: 2023-05-26
Payer: COMMERCIAL

## 2023-05-26 DIAGNOSIS — M54.42 ACUTE BILATERAL LOW BACK PAIN WITH BILATERAL SCIATICA: Primary | ICD-10-CM

## 2023-05-26 DIAGNOSIS — M54.41 ACUTE BILATERAL LOW BACK PAIN WITH BILATERAL SCIATICA: Primary | ICD-10-CM

## 2023-05-26 NOTE — TELEPHONE ENCOUNTER
Reason for call:  Other   Patient called regarding (reason for call): pt does not want the med that prescribe at e visit with Dr. Willis and also would like to have a physical therapy referral put in      Additional comments: please get referral and notify pt via phone call or my chart       Phone number to reach patient:  Cell number on file:    Telephone Information:   Mobile [phone]       Best Time:  anytime    Can we leave a detailed message on this number?  YES    Travel screening: Not Applicable

## 2024-01-12 ENCOUNTER — OFFICE VISIT (OUTPATIENT)
Dept: FAMILY MEDICINE | Facility: CLINIC | Age: 34
End: 2024-01-12
Payer: COMMERCIAL

## 2024-01-12 VITALS
TEMPERATURE: 98.4 F | OXYGEN SATURATION: 98 % | SYSTOLIC BLOOD PRESSURE: 125 MMHG | WEIGHT: 179.6 LBS | RESPIRATION RATE: 18 BRPM | HEIGHT: 68 IN | DIASTOLIC BLOOD PRESSURE: 72 MMHG | BODY MASS INDEX: 27.22 KG/M2 | HEART RATE: 81 BPM

## 2024-01-12 DIAGNOSIS — M54.16 LUMBAR RADICULOPATHY: Primary | ICD-10-CM

## 2024-01-12 PROCEDURE — 99214 OFFICE O/P EST MOD 30 MIN: CPT | Performed by: NURSE PRACTITIONER

## 2024-01-12 ASSESSMENT — ENCOUNTER SYMPTOMS: BACK PAIN: 1

## 2024-01-12 ASSESSMENT — PAIN SCALES - GENERAL: PAINLEVEL: MODERATE PAIN (4)

## 2024-01-12 NOTE — PROGRESS NOTES
"  Assessment & Plan     Lumbar radiculopathy  Questioning whether or not patient has scoliosis.  Certainly his mobility is limited by his back pain.  Discussed flexibility could be contributing to this.  I am concerned that initially when he injured his back he had some bladder involvement.  Based on these findings I think it be reasonable to perform an MRI.  At this time there are no red flag symptoms, but I would use caution proceeding with chiropractic treatments until we confirm there are no other issues going on.  He was agreeable to referral to PT now that he has insurance.  Prefers to avoid medication if possible, poor reaction to muscle relaxants.  Pending MRI results will consider referral to neurosurgery if indicated.  - MR Lumbar Spine w/o Contrast; Future  - Physical Therapy Referral; Future      30 minutes spent by me on the date of the encounter doing chart review, history and exam, documentation and further activities per the note       BMI:   Estimated body mass index is 27.31 kg/m  as calculated from the following:    Height as of this encounter: 1.727 m (5' 8\").    Weight as of this encounter: 81.5 kg (179 lb 9.6 oz).           GALLO Cramer CNP  [facility]    Karina Tompkins is a 33 year old, presenting for the following health issues:  Back Pain (-since Dec.2022 but the pain got worsen after oct. 2023)      1/12/2024     1:25 PM   Additional Questions   Roomed by Lizette SANDOVAL   Accompanied by self       Back Pain     History of Present Illness       Back Pain:  He presents for follow up of back pain. Patient's back pain is a chronic problem.  Location of back pain:  Right lower back, left lower back, right buttock, left buttock, right hip, left hip, right side of waist and left side of waist  Description of back pain: cramping, dull ache, sharp and stabbing  Back pain spreads: right buttocks, left buttocks, right thigh, left thigh, right knee, left knee, right foot " "and left foot    Since patient first noticed back pain, pain is: always present, but gets better and worse  Does back pain interfere with his job:  Yes       He eats 2-3 servings of fruits and vegetables daily.He consumes 0 sweetened beverage(s) daily.He exercises with enough effort to increase his heart rate 20 to 29 minutes per day.  He exercises with enough effort to increase his heart rate 3 or less days per week.   He is taking medications regularly.     Ongoing back pain for a long time with left sided sciatica.  Mid October had a strain while doing house work, ongoing for 2.5 weeks.  Did not have insurance and couldn't do any thing about it.    Since then has been doing PT, chiropractor, etc.      Pain has been shifting, now bilateral legs, behind legs, sometimes just one leg and goes down the anterior aspect.    Feels like he was compensating, feeling wobbly. Now noticing he leans to one side, family and friends have commented.      Initially had some difficulty with urination.    Feeling like now he urinates a lot slower since strain this fall    Goes to the gym and is working on flexibility with a .        Having some random intrusive thoughts at night that are disrupting sleep, this was never an issue prior to the back issue.        Review of Systems   Musculoskeletal:  Positive for back pain.      Constitutional, HEENT, cardiovascular, pulmonary, gi and gu systems are negative, except as otherwise noted.      Objective    /72 (BP Location: Right arm, Patient Position: Right side, Cuff Size: Adult Regular)   Pulse 81   Temp 98.4  F (36.9  C) (Temporal)   Resp 18   Ht 1.727 m (5' 8\")   Wt 81.5 kg (179 lb 9.6 oz)   SpO2 98%   BMI 27.31 kg/m    Body mass index is 27.31 kg/m .  Physical Exam   GENERAL: healthy, alert and no distress  MS: no gross musculoskeletal defects noted, no edema  Comprehensive back pain exam:  Limited mobility with forward and backward extension, staning has a lean " to the left.  Equal strength bilaterally.

## 2024-01-19 ENCOUNTER — THERAPY VISIT (OUTPATIENT)
Dept: PHYSICAL THERAPY | Facility: CLINIC | Age: 34
End: 2024-01-19
Payer: COMMERCIAL

## 2024-01-19 ENCOUNTER — TELEPHONE (OUTPATIENT)
Dept: FAMILY MEDICINE | Facility: CLINIC | Age: 34
End: 2024-01-19

## 2024-01-19 ENCOUNTER — ANCILLARY PROCEDURE (OUTPATIENT)
Dept: MRI IMAGING | Facility: CLINIC | Age: 34
End: 2024-01-19
Attending: NURSE PRACTITIONER
Payer: COMMERCIAL

## 2024-01-19 DIAGNOSIS — G83.4 CAUDA EQUINA COMPRESSION (H): Primary | ICD-10-CM

## 2024-01-19 DIAGNOSIS — M54.16 LUMBAR RADICULOPATHY: ICD-10-CM

## 2024-01-19 LAB — RADIOLOGIST FLAGS: ABNORMAL

## 2024-01-19 PROCEDURE — 97161 PT EVAL LOW COMPLEX 20 MIN: CPT | Mod: GP | Performed by: PHYSICAL THERAPIST

## 2024-01-19 NOTE — PROGRESS NOTES
PHYSICAL THERAPY EVALUATION  Type of Visit: Evaluation    See electronic medical record for Abuse and Falls Screening details.    Subjective     Pt is a 33 year old male presenting with complaints of Low back pain R>L LE pain down to knee/calf.    The symptoms started initially December 2022 initially but in October 2023 with no EVELYN but unable to walk. He was in bed for 2 weeks   Pt describes the pain as dull ache in LE thigh at rest. Sharp with a certain movement or standing/walking   Prior treatments: chiropractor with benefit, exercises from friends and chiro.  The resulting functional limitations include walking, sitting in certain chairs/cars  Pain is better with laying flat  Sleep Quality: sleep has been better now.   Current level of activity: gym 1x/week with stretching and light weights  Red Flags: urgency of bowel/bladder in first two weeks of injury- nothing now except slower urination, tingling/numbness posterior thigh but rarely to groin, balance forward/backwards more challenging, colder/clammy feet.  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 Resultant impingement of the cauda  equina nerve roots.          Presenting condition or subjective complaint: lower back pain, difficulties walking/transferring  10/2023  Date of onset: 10/19/23    Relevant medical history: Asthma; Chest pain; History of fractures; Overweight; Vision problems   Dates & types of surgery: 2008 non invasive ankle surgery    Prior diagnostic imaging/testing results: MRI; CT scan     Prior therapy history for the same diagnosis, illness or injury: No      Prior Level of Function  Transfers: Independent  Ambulation: Independent  ADL: Independent    Living Environment  Social support: With a significant other or spouse   Type of home: House   Stairs to enter the home: Yes 4 Is there a railing: No    Ramp: Yes   Stairs inside the home: Yes 10     Help at home: Self Cares (home health aide/personal care attendant, family, etc)  Equipment owned:       Employment: Yes graphic design  Hobbies/Interests: house projects, wood working, [employer]/cars    Patient goals for therapy: walk without pain, exericise, run, lift    Pain assessment: Pain present     Objective   LUMBAR SPINE EVALUATION    POSTURE: rounded shoulders     GAIT:   Gait Deviations: Antalgic- L lean    BALANCE/PROPRIOCEPTION:  not formally tested. Per reports forward/back balance is challenging more than side to side.     MYOTOMES:    Left Right   T12-L3 (Hip Flexion) 5 4+   L2-4 (Quads)  5 - unable to fully straighten leg  4+ unable to fully straighten leg    L4 (Ankle DF) 4 5   L5 (Great Toe Ext) 5 5   S1 (Toe Raise)       Heel Walking: troubles keeping toes elevated especially on L, wobbly and slow.  Toe Walking: Able to complete but limited ROM oh heels off ground, more wobbly and unstable than normal      DTR S:    Left Right   C5 (Biceps)     C6 (Brachioradialis)     C7 (Triceps)     L4 (Quad) 3  (jerky - full leg twitch)   S1 (Achilles) 2  (jerky - full leg twitch)     DERMATOMES:    Left Right   T12      L1     L2 Hypo (light touch) Normal (light touch)   L3 Hypo (light touch) Normal (light touch)   L4     L5     S1 Hyper (light touch) Normal (light touch)     Dermatomes summary: Overall not overly sensitive to light touch throughout - dermatomes minimally affected     NEURAL TENSION: unable to fully straighten leg in seated position with testing for knee extension strength.    PALPATION:         Assessment & Plan   CLINICAL IMPRESSIONS  Medical Diagnosis: Lumbar radiculopathy    Treatment Diagnosis: Low Back Pain and B LE Symptoms   Impression/Assessment: Patient is a 33 year old male with low back and B LE complaints.  The following significant findings have been identified: Pain, Decreased ROM/flexibility, Decreased joint mobility,  Decreased strength, Impaired balance, Decreased proprioception, Impaired sensation, Impaired gait, Impaired muscle performance, Decreased activity tolerance, Impaired posture, and Instability. These impairments interfere with their ability to perform self care tasks, work tasks, recreational activities, household chores, household mobility, and community mobility as compared to previous level of function.     Clinical Decision Making (Complexity):  Clinical Presentation: Stable/Uncomplicated  Clinical Presentation Rationale: based on medical and personal factors listed in PT evaluation  Clinical Decision Making (Complexity): Low complexity    PLAN OF CARE  Treatment Interventions:  Modalities: Dry Needling, Mechanical Traction  Interventions: Gait Training, Manual Therapy, Neuromuscular Re-education, Therapeutic Activity, Therapeutic Exercise, Self-Care/Home Management    Long Term Goals     PT Goal 1  Goal Identifier: Walking  Goal Description: Pt will be able to walk for >30 min without increase in symptoms <2/10 NRS  Rationale: to maximize safety and independence with performance of ADLs and functional tasks;to maximize safety and independence within the home;to maximize safety and independence within the community;to maximize safety and independence with transportation;to maximize safety and independence with self cares  Target Date: 03/15/24  PT Goal 2  Goal Identifier: HH chores  Goal Description: Pt will be able to complete household chores and yard work without increase in pain and without LE symptoms or significant difficulty getting around  Rationale: to maximize safety and independence with performance of ADLs and functional tasks;to maximize safety and independence with self cares;to maximize safety and independence within the home  Target Date: 03/15/24      Frequency of Treatment: 1x/week  Duration of Treatment: 8 weeks    Recommended Referrals to Other Professionals: back to referring provider to  review MRI and make decisions on the next steps prior to continuing therapy.    Education Assessment:   Learner/Method: Patient  Education Comments: Eager to participate in therapy    Risks and benefits of evaluation/treatment have been explained.   Patient/Family/caregiver agrees with Plan of Care.     Evaluation Time:             Signing Clinician: Sujey Urrutia, PT

## 2024-01-19 NOTE — TELEPHONE ENCOUNTER
Contacted [facility], on call for neurosurgery- did not recommend urgent intervention based on symptoms.  Patient is currently asymptomatic, denies bowel/bladder issues, no foot drop or paresthesias.  If he does develop any of these symptoms, should report to the ED.  Patient notified via phone call    Also discussed with Dr. Petty of [facility] who will get patient on his schedule hopefully in the next week.      Patient advised light duty until seen by neurosurg.
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
## 2024-01-19 NOTE — TELEPHONE ENCOUNTER
Socorro confirmed she contacted patient herself, no action needed from Rns at this time.     ANAMARIA RealN KOURTNEY  [facility]

## 2024-01-24 ENCOUNTER — TRANSFERRED RECORDS (OUTPATIENT)
Dept: HEALTH INFORMATION MANAGEMENT | Facility: CLINIC | Age: 34
End: 2024-01-24
Payer: COMMERCIAL

## 2024-01-26 ENCOUNTER — THERAPY VISIT (OUTPATIENT)
Dept: PHYSICAL THERAPY | Facility: CLINIC | Age: 34
End: 2024-01-26
Payer: COMMERCIAL

## 2024-01-26 DIAGNOSIS — M54.16 LUMBAR RADICULOPATHY: Primary | ICD-10-CM

## 2024-01-26 PROCEDURE — 97110 THERAPEUTIC EXERCISES: CPT | Mod: GP | Performed by: PHYSICAL THERAPIST

## 2024-02-02 ENCOUNTER — THERAPY VISIT (OUTPATIENT)
Dept: PHYSICAL THERAPY | Facility: CLINIC | Age: 34
End: 2024-02-02
Payer: COMMERCIAL

## 2024-02-02 DIAGNOSIS — M54.16 LUMBAR RADICULOPATHY: Primary | ICD-10-CM

## 2024-02-02 PROCEDURE — 97110 THERAPEUTIC EXERCISES: CPT | Mod: GP | Performed by: PHYSICAL THERAPIST

## 2024-02-08 ENCOUNTER — MYC MEDICAL ADVICE (OUTPATIENT)
Dept: FAMILY MEDICINE | Facility: CLINIC | Age: 34
End: 2024-02-08
Payer: COMMERCIAL

## 2024-02-13 ENCOUNTER — OFFICE VISIT (OUTPATIENT)
Dept: FAMILY MEDICINE | Facility: CLINIC | Age: 34
End: 2024-02-13
Payer: COMMERCIAL

## 2024-02-13 VITALS
TEMPERATURE: 97.8 F | WEIGHT: 184.7 LBS | DIASTOLIC BLOOD PRESSURE: 80 MMHG | SYSTOLIC BLOOD PRESSURE: 124 MMHG | RESPIRATION RATE: 18 BRPM | OXYGEN SATURATION: 99 % | HEART RATE: 75 BPM | HEIGHT: 68 IN | BODY MASS INDEX: 27.99 KG/M2

## 2024-02-13 DIAGNOSIS — Z01.818 PREOP GENERAL PHYSICAL EXAM: Primary | ICD-10-CM

## 2024-02-13 DIAGNOSIS — G83.4 CAUDA EQUINA COMPRESSION (H): ICD-10-CM

## 2024-02-13 LAB
ANION GAP SERPL CALCULATED.3IONS-SCNC: 10 MMOL/L (ref 7–15)
BUN SERPL-MCNC: 9.5 MG/DL (ref 6–20)
CALCIUM SERPL-MCNC: 9.5 MG/DL (ref 8.6–10)
CHLORIDE SERPL-SCNC: 103 MMOL/L (ref 98–107)
CREAT SERPL-MCNC: 0.9 MG/DL (ref 0.67–1.17)
DEPRECATED HCO3 PLAS-SCNC: 27 MMOL/L (ref 22–29)
EGFRCR SERPLBLD CKD-EPI 2021: >90 ML/MIN/1.73M2
ERYTHROCYTE [DISTWIDTH] IN BLOOD BY AUTOMATED COUNT: 12.3 % (ref 10–15)
GLUCOSE SERPL-MCNC: 96 MG/DL (ref 70–99)
HCT VFR BLD AUTO: 45.6 % (ref 40–53)
HGB BLD-MCNC: 15.6 G/DL (ref 13.3–17.7)
MCH RBC QN AUTO: 31.1 PG (ref 26.5–33)
MCHC RBC AUTO-ENTMCNC: 34.2 G/DL (ref 31.5–36.5)
MCV RBC AUTO: 91 FL (ref 78–100)
PLATELET # BLD AUTO: 163 10E3/UL (ref 150–450)
POTASSIUM SERPL-SCNC: 4.4 MMOL/L (ref 3.4–5.3)
RBC # BLD AUTO: 5.01 10E6/UL (ref 4.4–5.9)
SODIUM SERPL-SCNC: 140 MMOL/L (ref 135–145)
WBC # BLD AUTO: 5.1 10E3/UL (ref 4–11)

## 2024-02-13 PROCEDURE — 85027 COMPLETE CBC AUTOMATED: CPT | Performed by: NURSE PRACTITIONER

## 2024-02-13 PROCEDURE — 80048 BASIC METABOLIC PNL TOTAL CA: CPT | Performed by: NURSE PRACTITIONER

## 2024-02-13 PROCEDURE — 99214 OFFICE O/P EST MOD 30 MIN: CPT | Performed by: NURSE PRACTITIONER

## 2024-02-13 PROCEDURE — 36415 COLL VENOUS BLD VENIPUNCTURE: CPT | Performed by: NURSE PRACTITIONER

## 2024-02-13 NOTE — LETTER
2/13/2024        RE: Turner Jasso  [address]  [place] [address]        Preoperative Evaluation  [facility]  [address]  [address]  [place] [address]  Phone: [phone]  Fax: [phone]  Primary Provider: Maurice Tong  Pre-op Performing Provider: AMRIT WAGGONER  Feb 13, 2024       Turner is a 33 year old, presenting for the following:  Pre-Op Exam        2/13/2024     8:56 AM   Additional Questions   Roomed by Payam   Accompanied by self     Surgical Information  Surgery/Procedure: Back surgery (L4-5 microdiskectomy)  Surgery Location: [facility] in [place]  Surgeon: Yoseph Petty MD  Surgery Date: 2/20/224  Time of Surgery: TBD  Where patient plans to recover: At home with family  Fax number for surgical facility: [phone]    Assessment & Plan    The proposed surgical procedure is considered INTERMEDIATE risk.    Preop general physical exam  - CBC with platelets; Future  - Basic metabolic panel  (Ca, Cl, CO2, Creat, Gluc, K, Na, BUN); Future  - CBC with platelets  - Basic metabolic panel  (Ca, Cl, CO2, Creat, Gluc, K, Na, BUN)    Cauda equina compression (H)  -see 1/19/24 MRI, saw [facility] 1/24- due to amount of stenosis and cauda equina compression, he is to have the scheduled L4-5 microdiskectomy.        - No identified additional risk factors other than previously addressed    Antiplatelet or Anticoagulation Medication Instructions   - Patient is on no antiplatelet or anticoagulation medications.    Additional Medication Instructions  Patient is on no additional chronic medications    Recommendation  APPROVAL GIVEN to proceed with proposed procedure, without further diagnostic evaluation.      Subjective      HPI related to upcoming procedure: lumbar radiculopathy with pain starting in Dec 2022, worsening since Oct 2023 with no trauma or specific injury. MRI 1/19/24 revealed cauda equina compression, [facility] evaluated 1/24 and recommended  the scheduled  L4-5 microdiskectomy.     Pain when standing still is the worst, better when walking around or lying down. Difficult to get back up if on the floor at times.         2/13/2024     8:54 AM   Preop Questions   1. Have you ever had a heart attack or stroke? No   2. Have you ever had surgery on your heart or blood vessels, such as a stent placement, a coronary artery bypass, or surgery on an artery in your head, neck, heart, or legs? No   3. Do you have chest pain with activity? No   4. Do you have a history of  heart failure? No   5. Do you currently have a cold, bronchitis or symptoms of other infection? No   6. Do you have a cough, shortness of breath, or wheezing? No   7. Do you or anyone in your family have previous history of blood clots? UNKNOWN -- adopted    8. Do you or does anyone in your family have a serious bleeding problem such as prolonged bleeding following surgeries or cuts? UNKNOWN - adopted    9. Have you ever had problems with anemia or been told to take iron pills? No   10. Have you had any abnormal blood loss such as black, tarry or bloody stools? No   11. Have you ever had a blood transfusion? No   12. Are you willing to have a blood transfusion if it is medically needed before, during, or after your surgery? Yes   13. Have you or any of your relatives ever had problems with anesthesia? UNKNOWN - adopted   14. Do you have sleep apnea, excessive snoring or daytime drowsiness? No   15. Do you have any artifical heart valves or other implanted medical devices like a pacemaker, defibrillator, or continuous glucose monitor? No   16. Do you have artificial joints? No   17. Are you allergic to latex? No       Health Care Directive  Patient does not have a Health Care Directive or Living Will: Discussed advance care planning with patient; information given to patient to review.    Preoperative Review of    reviewed - no record of controlled substances prescribed.          Patient  "Active Problem List    Diagnosis Date Noted     Lumbar radiculopathy 01/26/2024     Priority: Medium     Chest pain      Priority: Medium      No past medical history on file.  No past surgical history on file.  No current outpatient medications on file.       No Known Allergies     Social History     Tobacco Use     Smoking status: Never     Smokeless tobacco: Never   Substance Use Topics     Alcohol use: Yes       History   Drug Use Not on file         Review of Systems    Review of Systems  Constitutional, HEENT, cardiovascular, pulmonary, gi and gu systems are negative, except as otherwise noted.  Objective   /80 (BP Location: Right arm, Patient Position: Sitting, Cuff Size: Adult Regular)   Pulse 75   Temp 97.8  F (36.6  C) (Temporal)   Resp 18   Ht 1.727 m (5' 8\")   Wt 83.8 kg (184 lb 11.2 oz)   SpO2 99%   BMI 28.08 kg/m     Estimated body mass index is 28.08 kg/m  as calculated from the following:    Height as of this encounter: 1.727 m (5' 8\").    Weight as of this encounter: 83.8 kg (184 lb 11.2 oz).  Physical Exam      Recent Labs   Lab Test 04/27/23  0837   HGB 15.0         POTASSIUM 4.2   CR 0.89   A1C 5.0        Diagnostics  Labs pending at this time.  Results will be reviewed when available.  Recent Results (from the past 24 hour(s))   CBC with platelets    Collection Time: 02/13/24  9:35 AM   Result Value Ref Range    WBC Count 5.1 4.0 - 11.0 10e3/uL    RBC Count 5.01 4.40 - 5.90 10e6/uL    Hemoglobin 15.6 13.3 - 17.7 g/dL    Hematocrit 45.6 40.0 - 53.0 %    MCV 91 78 - 100 fL    MCH 31.1 26.5 - 33.0 pg    MCHC 34.2 31.5 - 36.5 g/dL    RDW 12.3 10.0 - 15.0 %    Platelet Count 163 150 - 450 10e3/uL      No EKG required, no history of coronary heart disease, significant arrhythmia, peripheral arterial disease or other structural heart disease.    Revised Cardiac Risk Index (RCRI)  The patient has the following serious cardiovascular risks for perioperative complications:   - No " serious cardiac risks = 0 points     RCRI Interpretation: 0 points: Class I (very low risk - 0.4% complication rate)       Estefany Wong NP Student    Physician Attestation  I, GALLO Cramer CNP, was present with the medical/ALISHA student who participated in the service and in the documentation of the note.  I have verified the history and personally performed the physical exam and medical decision making.  I agree with the assessment and plan of care as documented in the note.        GALLO Cramer CNP   Signed Electronically by: GALLO Cramer CNP  Copy of this evaluation report is provided to requesting physician.             Sincerely,        GALLO Cramer CNP

## 2024-02-13 NOTE — PROGRESS NOTES
Preoperative Evaluation  [facility]  [address]  [address]  [place] [address]  Phone: [phone]  Fax: [phone]  Primary Provider: Maurice Tong  Pre-op Performing Provider: AMRIT WAGGONER  Feb 13, 2024       Turner is a 33 year old, presenting for the following:  Pre-Op Exam        2/13/2024     8:56 AM   Additional Questions   Roomed by Payam   Accompanied by self     Surgical Information  Surgery/Procedure: Back surgery (L4-5 microdiskectomy)  Surgery Location: [facility] in [place]  Surgeon: Yoseph Petty MD  Surgery Date: 2/20/224  Time of Surgery: TBD  Where patient plans to recover: At home with family  Fax number for surgical facility: [phone]    Assessment & Plan     The proposed surgical procedure is considered INTERMEDIATE risk.    Preop general physical exam  - CBC with platelets; Future  - Basic metabolic panel  (Ca, Cl, CO2, Creat, Gluc, K, Na, BUN); Future  - CBC with platelets  - Basic metabolic panel  (Ca, Cl, CO2, Creat, Gluc, K, Na, BUN)    Cauda equina compression (H)  -see 1/19/24 MRI, saw [facility] 1/24- due to amount of stenosis and cauda equina compression, he is to have the scheduled L4-5 microdiskectomy.        - No identified additional risk factors other than previously addressed    Antiplatelet or Anticoagulation Medication Instructions   - Patient is on no antiplatelet or anticoagulation medications.    Additional Medication Instructions  Patient is on no additional chronic medications    Recommendation  APPROVAL GIVEN to proceed with proposed procedure, without further diagnostic evaluation.      Subjective       HPI related to upcoming procedure: lumbar radiculopathy with pain starting in Dec 2022, worsening since Oct 2023 with no trauma or specific injury. MRI 1/19/24 revealed cauda equina compression, [facility] evaluated 1/24 and recommended the scheduled  L4-5 microdiskectomy.     Pain when standing still is the worst,  better when walking around or lying down. Difficult to get back up if on the floor at times.         2/13/2024     8:54 AM   Preop Questions   1. Have you ever had a heart attack or stroke? No   2. Have you ever had surgery on your heart or blood vessels, such as a stent placement, a coronary artery bypass, or surgery on an artery in your head, neck, heart, or legs? No   3. Do you have chest pain with activity? No   4. Do you have a history of  heart failure? No   5. Do you currently have a cold, bronchitis or symptoms of other infection? No   6. Do you have a cough, shortness of breath, or wheezing? No   7. Do you or anyone in your family have previous history of blood clots? UNKNOWN -- adopted    8. Do you or does anyone in your family have a serious bleeding problem such as prolonged bleeding following surgeries or cuts? UNKNOWN - adopted    9. Have you ever had problems with anemia or been told to take iron pills? No   10. Have you had any abnormal blood loss such as black, tarry or bloody stools? No   11. Have you ever had a blood transfusion? No   12. Are you willing to have a blood transfusion if it is medically needed before, during, or after your surgery? Yes   13. Have you or any of your relatives ever had problems with anesthesia? UNKNOWN - adopted   14. Do you have sleep apnea, excessive snoring or daytime drowsiness? No   15. Do you have any artifical heart valves or other implanted medical devices like a pacemaker, defibrillator, or continuous glucose monitor? No   16. Do you have artificial joints? No   17. Are you allergic to latex? No       Health Care Directive  Patient does not have a Health Care Directive or Living Will: Discussed advance care planning with patient; information given to patient to review.    Preoperative Review of    reviewed - no record of controlled substances prescribed.          Patient Active Problem List    Diagnosis Date Noted    Lumbar radiculopathy 01/26/2024      "Priority: Medium    Chest pain      Priority: Medium      No past medical history on file.  No past surgical history on file.  No current outpatient medications on file.       No Known Allergies     Social History     Tobacco Use    Smoking status: Never    Smokeless tobacco: Never   Substance Use Topics    Alcohol use: Yes       History   Drug Use Not on file         Review of Systems    Review of Systems  Constitutional, HEENT, cardiovascular, pulmonary, gi and gu systems are negative, except as otherwise noted.  Objective    /80 (BP Location: Right arm, Patient Position: Sitting, Cuff Size: Adult Regular)   Pulse 75   Temp 97.8  F (36.6  C) (Temporal)   Resp 18   Ht 1.727 m (5' 8\")   Wt 83.8 kg (184 lb 11.2 oz)   SpO2 99%   BMI 28.08 kg/m     Estimated body mass index is 28.08 kg/m  as calculated from the following:    Height as of this encounter: 1.727 m (5' 8\").    Weight as of this encounter: 83.8 kg (184 lb 11.2 oz).  Physical Exam      Recent Labs   Lab Test 04/27/23  0837   HGB 15.0         POTASSIUM 4.2   CR 0.89   A1C 5.0        Diagnostics  Labs pending at this time.  Results will be reviewed when available.  Recent Results (from the past 24 hour(s))   CBC with platelets    Collection Time: 02/13/24  9:35 AM   Result Value Ref Range    WBC Count 5.1 4.0 - 11.0 10e3/uL    RBC Count 5.01 4.40 - 5.90 10e6/uL    Hemoglobin 15.6 13.3 - 17.7 g/dL    Hematocrit 45.6 40.0 - 53.0 %    MCV 91 78 - 100 fL    MCH 31.1 26.5 - 33.0 pg    MCHC 34.2 31.5 - 36.5 g/dL    RDW 12.3 10.0 - 15.0 %    Platelet Count 163 150 - 450 10e3/uL      No EKG required, no history of coronary heart disease, significant arrhythmia, peripheral arterial disease or other structural heart disease.    Revised Cardiac Risk Index (RCRI)  The patient has the following serious cardiovascular risks for perioperative complications:   - No serious cardiac risks = 0 points     RCRI Interpretation: 0 points: Class I (very low " risk - 0.4% complication rate)       Estefany Wong, DORETHA Student    Physician Attestation   I, GALLO Cramer CNP, was present with the medical/ALISHA student who participated in the service and in the documentation of the note.  I have verified the history and personally performed the physical exam and medical decision making.  I agree with the assessment and plan of care as documented in the note.        GALLO Cramer CNP   Signed Electronically by: GALLO Cramer CNP  Copy of this evaluation report is provided to requesting physician.

## 2024-02-13 NOTE — PATIENT INSTRUCTIONS
Preparing for Your Surgery  Getting started    How to Take Your Medication Before Surgery  - Take all of your medications before surgery as usual  A nurse will call you to review your health history and instructions. They will give you an arrival time based on your scheduled surgery time. Please be ready to share:  Your doctor's clinic name and phone number  Your medical, surgical, and anesthesia history  A list of allergies and sensitivities  A list of medicines, including herbal treatments and over-the-counter drugs  Whether the patient has a legal guardian (ask how to send us the papers in advance)  Please tell us if you're pregnant--or if there's any chance you might be pregnant. Some surgeries may injure a fetus (unborn baby), so they require a pregnancy test. Surgeries that are safe for a fetus don't always need a test, and you can choose whether to have one.   If you have a child who's having surgery, please ask for a copy of Preparing for Your Child's Surgery.    Preparing for surgery  Within 10 to 30 days of surgery: Have a pre-op exam (sometimes called an H&P, or History and Physical). This can be done at a clinic or pre-operative center.  If you're having a , you may not need this exam. Talk to your care team.  At your pre-op exam, talk to your care team about all medicines you take. If you need to stop any medicines before surgery, ask when to start taking them again.  We do this for your safety. Many medicines can make you bleed too much during surgery. Some change how well surgery (anesthesia) drugs work.  Call your insurance company to let them know you're having surgery. (If you don't have insurance, call [phone].)  Call your clinic if there's any change in your health. This includes signs of a cold or flu (sore throat, runny nose, cough, rash, fever). It also includes a scrape or scratch near the surgery site.  If you have questions on the day of surgery, call your hospital or surgery  center.  Eating and drinking guidelines  For your safety: Unless your surgeon tells you otherwise, follow the guidelines below.  Eat and drink as usual until 8 hours before you arrive for surgery. After that, no food or milk.  Drink clear liquids until 2 hours before you arrive. These are liquids you can see through, like water, Gatorade, and Propel Water. They also include plain black coffee and tea (no cream or milk), candy, and breath mints. You can spit out gum when you arrive.  If you drink alcohol: Stop drinking it the night before surgery.  If your care team tells you to take medicine on the morning of surgery, it's okay to take it with a sip of water.  Preventing infection  Shower or bathe the night before and morning of your surgery. Follow the instructions your clinic gave you. (If no instructions, use regular soap.)  Don't shave or clip hair near your surgery site. We'll remove the hair if needed.  Don't smoke or vape the morning of surgery. You may chew nicotine gum up to 2 hours before surgery. A nicotine patch is okay.  Note: Some surgeries require you to completely quit smoking and nicotine. Check with your surgeon.  Your care team will make every effort to keep you safe from infection. We will:  Clean our hands often with soap and water (or an alcohol-based hand rub).  Clean the skin at your surgery site with a special soap that kills germs.  Give you a special gown to keep you warm. (Cold raises the risk of infection.)  Wear special hair covers, masks, gowns and gloves during surgery.  Give antibiotic medicine, if prescribed. Not all surgeries need antibiotics.  What to bring on the day of surgery  Photo ID and insurance card  Copy of your health care directive, if you have one  Glasses and hearing aids (bring cases)  You can't wear contacts during surgery  Inhaler and eye drops, if you use them (tell us about these when you arrive)  CPAP machine or breathing device, if you use them  A few personal  items, if spending the night  If you have . . .  A pacemaker, ICD (cardiac defibrillator) or other implant: Bring the ID card.  An implanted stimulator: Bring the remote control.  A legal guardian: Bring a copy of the certified (court-stamped) guardianship papers.  Please remove any jewelry, including body piercings. Leave jewelry and other valuables at home.  If you're going home the day of surgery  You must have a responsible adult drive you home. They should stay with you overnight as well.  If you don't have someone to stay with you, and you aren't safe to go home alone, we may keep you overnight. Insurance often won't pay for this.  After surgery  If it's hard to control your pain or you need more pain medicine, please call your surgeon's office.  Questions?   If you have any questions for your care team, list them here: _________________________________________________________________________________________________________________________________________________________________________ ____________________________________ ____________________________________ ____________________________________  For informational purposes only. Not to replace the advice of your health care provider. Copyright   2003, 2019 [employer]. All rights reserved. Clinically reviewed by Lauren Katz MD. [employer] [id number] - REV 12/22.

## 2024-03-28 ENCOUNTER — PATIENT OUTREACH (OUTPATIENT)
Dept: CARE COORDINATION | Facility: CLINIC | Age: 34
End: 2024-03-28
Payer: COMMERCIAL

## 2024-04-11 ENCOUNTER — PATIENT OUTREACH (OUTPATIENT)
Dept: CARE COORDINATION | Facility: CLINIC | Age: 34
End: 2024-04-11
Payer: COMMERCIAL

## 2024-07-14 ENCOUNTER — HEALTH MAINTENANCE LETTER (OUTPATIENT)
Age: 34
End: 2024-07-14

## 2024-10-24 ENCOUNTER — PATIENT OUTREACH (OUTPATIENT)
Dept: CARE COORDINATION | Facility: CLINIC | Age: 34
End: 2024-10-24
Payer: COMMERCIAL

## 2024-12-10 ENCOUNTER — ANCILLARY PROCEDURE (OUTPATIENT)
Dept: GENERAL RADIOLOGY | Facility: CLINIC | Age: 34
End: 2024-12-10
Attending: NURSE PRACTITIONER
Payer: COMMERCIAL

## 2024-12-10 ENCOUNTER — OFFICE VISIT (OUTPATIENT)
Dept: URGENT CARE | Facility: URGENT CARE | Age: 34
End: 2024-12-10
Payer: COMMERCIAL

## 2024-12-10 VITALS
TEMPERATURE: 98.8 F | BODY MASS INDEX: 27.13 KG/M2 | DIASTOLIC BLOOD PRESSURE: 85 MMHG | OXYGEN SATURATION: 97 % | SYSTOLIC BLOOD PRESSURE: 132 MMHG | WEIGHT: 179 LBS | HEIGHT: 68 IN | HEART RATE: 87 BPM | RESPIRATION RATE: 16 BRPM

## 2024-12-10 DIAGNOSIS — M54.2 NECK PAIN: ICD-10-CM

## 2024-12-10 DIAGNOSIS — M54.2 NECK PAIN: Primary | ICD-10-CM
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
## 2024-12-10 PROCEDURE — 99214 OFFICE O/P EST MOD 30 MIN: CPT | Performed by: NURSE PRACTITIONER

## 2024-12-10 PROCEDURE — 72040 X-RAY EXAM NECK SPINE 2-3 VW: CPT | Mod: TC | Performed by: RADIOLOGY

## 2024-12-10 ASSESSMENT — PAIN SCALES - GENERAL: PAINLEVEL_OUTOF10: MODERATE PAIN (4)
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
## 2025-07-19 ENCOUNTER — HEALTH MAINTENANCE LETTER (OUTPATIENT)
Age: 35
End: 2025-07-19